# Patient Record
(demographics unavailable — no encounter records)

---

## 2024-10-21 NOTE — HISTORY OF PRESENT ILLNESS
[FreeTextEntry1] : 50 y.o. male with h/o Type 2 DM diagnosed in 2018, HTN, hyperlipidemia and obesity presents for follow up visit.    He was at [facility] and had ablation on 9/6/23. Does feel better now.   Also reports low testosterone level with his PCP.    Had COVID-19 in March 2021 and had pneumonia. Reports dizziness with Jardiance and stopped it.   Currently taking Mounjaro 12.5 mg SQ weekly (tolerating it well) and glimepiride 1 mg daily (was off for 1 month and reports body pains improved). Denies recent hypoglycemia. Did not tolerate Metformin in the past secondary to GI issues.   UTD with ophthalmology (2024) and no retinopathy. Reports dry eyes. Follows with podiatry but not recently. Reports dry skin and cracking of the feet. No proteinuria. No neuropathy.   Not monitoring FS at home. No exercise. Reports constant pain. Reports diffuse body aches but less. Now off gabapentin and taking muscle relaxant and NSAIDs.  Seeing cardiology.   Diet is variable but reports eating less. Reports weight is down.   In regard to vitamin D def, taking vitamin D 50,000 IU weekly.    Also takes vitamin B12 1,000 mcg daily. Also takes vitamin C, magnesium and turmeric.   In regard to hyperlipidemia, stopped Rosuvastatin in the past given body pains and now back on Rosuvastatin 10 mg daily.   Does have sleep apnea and using BIPAP. Does get polyuria and polydipsia. No SOB or CP. Reports depression and anxiety.   In regard to hypogonadism, never had pituitary MRI.

## 2024-10-21 NOTE — REVIEW OF SYSTEMS
[Fatigue] : fatigue [Palpitations] : palpitations [Erectile Dysfunction] : erectile dysfunction [Muscle Weakness] : muscle weakness [Myalgia] : myalgia  [Dry Skin] : dry skin [Pain/Numbness of Digits] : pain/numbness of digits [Depression] : depression [Anxiety] : anxiety [Polydipsia] : polydipsia [Swelling] : swelling [Negative] : Respiratory [Recent Weight Loss (___ Lbs)] : recent weight loss: [unfilled] lbs [Recent Weight Gain (___ Lbs)] : no recent weight gain [Polyuria] : no polyuria [Decreased Libido] : no decreased libido [FreeTextEntry7] : some IBS symptoms

## 2024-10-21 NOTE — PHYSICAL EXAM
[Alert] : alert [No Acute Distress] : no acute distress [Normal Sclera/Conjunctiva] : normal sclera/conjunctiva [EOMI] : extra ocular movement intact [No LAD] : no lymphadenopathy [Thyroid Not Enlarged] : the thyroid was not enlarged [No Thyroid Nodules] : no palpable thyroid nodules [No Respiratory Distress] : no respiratory distress [Clear to Auscultation] : lungs were clear to auscultation bilaterally [Normal S1, S2] : normal S1 and S2 [Regular Rhythm] : with a regular rhythm [Normal Bowel Sounds] : normal bowel sounds [Not Tender] : non-tender [Not Distended] : not distended [Soft] : abdomen soft [Normal Anterior Cervical Nodes] : no anterior cervical lymphadenopathy [Normal Gait] : normal gait [No Rash] : no rash [Right foot was examined, including] : right foot ~C was examined, including visual inspection with sensory and pulse exams [Left foot was examined, including] : left foot ~C was examined, including visual inspection with sensory and pulse exams [Swelling] : swollen [2+] : 2+ in the dorsalis pedis [Diminished Throughout Both Feet] : diminished tactile sensation with monofilament testing throughout both feet [Normal Reflexes] : deep tendon reflexes were 2+ and symmetric [Normal Affect] : the affect was normal [Normal Mood] : the mood was normal [de-identified] : mild edema b/l [FreeTextEntry1] : tinea pedis [FreeTextEntry2] : onychomcyosis [FreeTextEntry5] : tinea pedis [FreeTextEntry6] : onychomycosis

## 2024-10-21 NOTE — ASSESSMENT
[Diabetes Foot Care] : diabetes foot care [Long Term Vascular Complications] : long term vascular complications of diabetes [Carbohydrate Consistent Diet] : carbohydrate consistent diet [Importance of Diet and Exercise] : importance of diet and exercise to improve glycemic control, achieve weight loss and improve cardiovascular health [FreeTextEntry1] : 50 y.o. male with h/o Type 2 DM, HTN, hyperlipidemia, vitamin D def and low testosterone.   1. Type 2 DM- Suboptimal control with Hba1c of 7.5% today which is improved. Encouraged a carbohydrate consistent diet and exercise as tolerated. Encouraged SMBG and forwarding blood glucose log. Discussed risks and benefits of GLP-1 Jose Raul and Mounjaro. Will titrate up Mounjaro to 15 mg SQ weekly. Will continue glimepiride 1 mg daily. Had intolerance to Metformin and SGLT-2 inhibitor. Stable CMP and urine alb/cr ratio in 6/2024.   2. HTN- BP is above goal. Stressed compliance with medication. Will continue Valsartan/HCTZ and metoprolol for now. Follows with cardiology.  3. Hyperlipidemia- Lipids are at goal. Will continue Rosuvastatin 10 mg daily.   4. Vitamin D def- Will continue vitamin D 50,000 Iu weekly.  5. Low testosterone- UTD with testosterone, SHBG, prolactin, LH and FSH. Normal PSA. Will check pituitary MRI. Recommend follow up with urology.    Follow up in 3 to 4 months.  Follow up with cardiology, podiatry and ophthalmology

## 2024-10-21 NOTE — HISTORY OF PRESENT ILLNESS
[FreeTextEntry1] : 50 y.o. male with h/o Type 2 DM diagnosed in 2018, HTN, hyperlipidemia and obesity presents for follow up visit.    He was at [facility] and had ablation on 9/6/23. Does feel better now.   Also reports low testosterone level with his PCP.    Had COVID-19 in March 2021 and had pneumonia. Reports dizziness with Jardiance and stopped it.   Currently taking Mounjaro 12.5 mg SQ weekly (tolerating it well) and glimepiride 1 mg daily (was off for 1 month and reports body pains improved). Denies recent hypoglycemia. Did not tolerate Metformin in the past secondary to GI issues.   UTD with ophthalmology (2024) and no retinopathy. Reports dry eyes. Follows with podiatry but not recently. Reports dry skin and cracking of the feet. No proteinuria. No neuropathy.   Not monitoring FS at home. No exercise. Reports constant pain. Reports diffuse body aches but less. Now off gabapentin and taking muscle relaxant and NSAIDs.  Seeing cardiology.   Diet is variable but reports eating less. Reports weight is down.   In regard to vitamin D def, taking vitamin D 50,000 IU weekly.    Also takes vitamin B12 1,000 mcg daily. Also takes vitamin C, magnesium and turmeric.   In regard to hyperlipidemia, stopped Rosuvastatin in the past given body pains and now back on Rosuvastatin 10 mg daily.   Does have sleep apnea and using BIPAP. Does get polyuria and polydipsia. No SOB or CP. Reports depression and anxiety.   In regard to hypogonadism, never had pituitary MRI.

## 2024-10-21 NOTE — PHYSICAL EXAM
[Alert] : alert [No Acute Distress] : no acute distress [Normal Sclera/Conjunctiva] : normal sclera/conjunctiva [EOMI] : extra ocular movement intact [No LAD] : no lymphadenopathy [Thyroid Not Enlarged] : the thyroid was not enlarged [No Thyroid Nodules] : no palpable thyroid nodules [No Respiratory Distress] : no respiratory distress [Clear to Auscultation] : lungs were clear to auscultation bilaterally [Normal S1, S2] : normal S1 and S2 [Regular Rhythm] : with a regular rhythm [Normal Bowel Sounds] : normal bowel sounds [Not Tender] : non-tender [Not Distended] : not distended [Soft] : abdomen soft [Normal Anterior Cervical Nodes] : no anterior cervical lymphadenopathy [Normal Gait] : normal gait [No Rash] : no rash [Right foot was examined, including] : right foot ~C was examined, including visual inspection with sensory and pulse exams [Left foot was examined, including] : left foot ~C was examined, including visual inspection with sensory and pulse exams [Swelling] : swollen [2+] : 2+ in the dorsalis pedis [Diminished Throughout Both Feet] : diminished tactile sensation with monofilament testing throughout both feet [Normal Reflexes] : deep tendon reflexes were 2+ and symmetric [Normal Affect] : the affect was normal [Normal Mood] : the mood was normal [de-identified] : mild edema b/l [FreeTextEntry1] : tinea pedis [FreeTextEntry2] : onychomcyosis [FreeTextEntry5] : tinea pedis [FreeTextEntry6] : onychomycosis

## 2025-03-12 NOTE — REVIEW OF SYSTEMS
[Fatigue] : fatigue [Recent Weight Loss (___ Lbs)] : recent weight loss: [unfilled] lbs [Palpitations] : palpitations [Erectile Dysfunction] : erectile dysfunction [Muscle Weakness] : muscle weakness [Myalgia] : myalgia  [Dry Skin] : dry skin [Pain/Numbness of Digits] : pain/numbness of digits [Depression] : depression [Anxiety] : anxiety [Polydipsia] : polydipsia [Swelling] : swelling [Negative] : Respiratory [Recent Weight Gain (___ Lbs)] : no recent weight gain [Polyuria] : no polyuria [Decreased Libido] : no decreased libido [FreeTextEntry7] : some IBS symptoms

## 2025-03-12 NOTE — HISTORY OF PRESENT ILLNESS
[FreeTextEntry1] : 50 y.o. male with h/o Type 2 DM diagnosed in 2018, HTN, hyperlipidemia and obesity presents for follow up visit.     He reports palpitations now. Seeing cardiology at the end of the month.   He was at [facility] and had ablation on 9/6/23. Does feel better now.   Also reports low testosterone level with his PCP.    Had COVID-19 in March 2021 and had pneumonia. Reports dizziness with Jardiance and stopped it.   Currently taking Mounjaro 15 mg SQ weekly (tolerating it well) and glimepiride 1 mg daily (was off for 1 month and reports body pains improved). Denies recent hypoglycemia. Did not tolerate Metformin in the past secondary to GI issues.   Past due with ophthalmology (2024) and no retinopathy. Reports dry eyes. Follows with podiatry but not recently. Reports dry skin and cracking of the feet. No proteinuria. No neuropathy.   Not monitoring FS at home. No exercise. Reports constant pain. Reports diffuse body aches but less. Now off gabapentin and taking muscle relaxant and NSAIDs.  Seeing cardiology.   Reports increase in urination.  Diet is variable but reports eating less. Reports weight is down.   In regard to vitamin D def, taking vitamin D 50,000 IU weekly.    Also takes vitamin B12 1,000 mcg daily. Also takes vitamin C, magnesium and turmeric.   In regard to hyperlipidemia, stopped Rosuvastatin in the past given body pains and now back on Rosuvastatin 10 mg daily.   Does have sleep apnea and using BIPAP. Does get polyuria and polydipsia. No SOB or CP. Reports depression and anxiety.   In regard to hypogonadism, never had pituitary MRI.

## 2025-03-12 NOTE — ASSESSMENT
[Diabetes Foot Care] : diabetes foot care [Long Term Vascular Complications] : long term vascular complications of diabetes [Carbohydrate Consistent Diet] : carbohydrate consistent diet [Importance of Diet and Exercise] : importance of diet and exercise to improve glycemic control, achieve weight loss and improve cardiovascular health [FreeTextEntry1] : 50 y.o. male with h/o Type 2 DM, HTN, hyperlipidemia, vitamin D def and low testosterone.   1. Type 2 DM- Good control with Hba1c of 6.3% in 2/2025 which is improved. Encouraged a carbohydrate consistent diet and exercise as tolerated. Encouraged SMBG and forwarding blood glucose log. Discussed risks and benefits of GLP-1 Jose Raul and Mounjaro. Will continue Mounjaro 15 mg SQ weekly. Will continue glimepiride 1 mg daily. Had intolerance to Metformin and SGLT-2 inhibitor. Stable CMP and urine alb/cr ratio in 6/2024.   2. HTN- BP is above goal. Will continue Valsartan/HCTZ and metoprolol for now. Follows with cardiology.  3. Hyperlipidemia- Lipids are at goal. Will continue Rosuvastatin 10 mg daily.   4. Vitamin D def- Will continue vitamin D 50,000 Iu weekly.  5. Low testosterone- UTD with testosterone, SHBG, prolactin, LH and FSH. Normal PSA. Will check pituitary MRI. Recommend follow up with urology.    Follow up in 3 to 4 months.  Follow up with cardiology, podiatry and ophthalmology

## 2025-03-12 NOTE — DATA REVIEWED
[FreeTextEntry1] : Labs 2/10/25 glucose 113  BUN/cr 17/.80 calcium 9.1 TSH 2.10 chol 106 HDL 33 LDL 63 tri 49 H/H 14.5/43.4 Hba1c 6.3%

## 2025-03-12 NOTE — HISTORY OF PRESENT ILLNESS
[FreeTextEntry1] : 50 y.o. male with h/o Type 2 DM diagnosed in 2018, HTN, hyperlipidemia and obesity presents for follow up visit.     He reports palpitations now. Seeing cardiology at the end of the month.   He was at [facility] and had ablation on 9/6/23. Does feel better now.   Also reports low testosterone level with his PCP.    Had COVID-19 in March 2021 and had pneumonia. Reports dizziness with Jardiance and stopped it.   Currently taking Mounjaro 15 mg SQ weekly (tolerating it well) and glimepiride 1 mg daily (was off for 1 month and reports body pains improved). Denies recent hypoglycemia. Did not tolerate Metformin in the past secondary to GI issues.   Past due with ophthalmology (2024) and no retinopathy. Reports dry eyes. Follows with podiatry but not recently. Reports dry skin and cracking of the feet. No proteinuria. No neuropathy.   Not monitoring FS at home. No exercise. Reports constant pain. Reports diffuse body aches but less. Now off gabapentin and taking muscle relaxant and NSAIDs.  Seeing cardiology.   Reports increase in urination.  Diet is variable but reports eating less. Reports weight is down.   In regard to vitamin D def, taking vitamin D 50,000 IU weekly.    Also takes vitamin B12 1,000 mcg daily. Also takes vitamin C, magnesium and turmeric.   In regard to hyperlipidemia, stopped Rosuvastatin in the past given body pains and now back on Rosuvastatin 10 mg daily.   Does have sleep apnea and using BIPAP. Does get polyuria and polydipsia. No SOB or CP. Reports depression and anxiety.   In regard to hypogonadism, never had pituitary MRI.

## 2025-03-12 NOTE — PHYSICAL EXAM
[Alert] : alert [No Acute Distress] : no acute distress [Normal Sclera/Conjunctiva] : normal sclera/conjunctiva [EOMI] : extra ocular movement intact [No LAD] : no lymphadenopathy [Thyroid Not Enlarged] : the thyroid was not enlarged [No Thyroid Nodules] : no palpable thyroid nodules [No Respiratory Distress] : no respiratory distress [Clear to Auscultation] : lungs were clear to auscultation bilaterally [Normal S1, S2] : normal S1 and S2 [Regular Rhythm] : with a regular rhythm [Normal Bowel Sounds] : normal bowel sounds [Not Tender] : non-tender [Not Distended] : not distended [Soft] : abdomen soft [Normal Anterior Cervical Nodes] : no anterior cervical lymphadenopathy [Normal Gait] : normal gait [No Rash] : no rash [Normal Reflexes] : deep tendon reflexes were 2+ and symmetric [Normal Affect] : the affect was normal [Normal Mood] : the mood was normal [de-identified] : mild edema b/l [de-identified] : last foot exam in October 2024

## 2025-07-28 NOTE — REVIEW OF SYSTEMS
[Fatigue] : fatigue [Palpitations] : palpitations [Erectile Dysfunction] : erectile dysfunction [Muscle Weakness] : muscle weakness [Myalgia] : myalgia  [Dry Skin] : dry skin [Pain/Numbness of Digits] : pain/numbness of digits [Depression] : depression [Anxiety] : anxiety [Polydipsia] : polydipsia [Swelling] : swelling [Negative] : Respiratory [Recent Weight Gain (___ Lbs)] : no recent weight gain [Recent Weight Loss (___ Lbs)] : no recent weight loss [Polyuria] : no polyuria [Decreased Libido] : no decreased libido [FreeTextEntry7] : some IBS symptoms

## 2025-07-28 NOTE — ASSESSMENT
[Diabetes Foot Care] : diabetes foot care [Long Term Vascular Complications] : long term vascular complications of diabetes [Carbohydrate Consistent Diet] : carbohydrate consistent diet [Importance of Diet and Exercise] : importance of diet and exercise to improve glycemic control, achieve weight loss and improve cardiovascular health [FreeTextEntry1] : 50 y.o. male with h/o Type 2 DM, HTN, hyperlipidemia, vitamin D def and low testosterone.   1. Type 2 DM- Suboptimal control with Hba1c of 7.6% today. Encouraged a carbohydrate consistent diet and exercise as tolerated. Encouraged SMBG and forwarding blood glucose log. Discussed risks and benefits of GLP-1 Jose Raul and Mounjaro. Will continue Mounjaro 15 mg SQ weekly. Will continue glimepiride 2 mg daily. Had intolerance to Metformin and SGLT-2 inhibitor. Will check CMP and urine alb/cr ratio.   2. HTN- BP is above goal. Will continue Valsartan/HCTZ and metoprolol for now. Follows with cardiology.  3. Hyperlipidemia- Will check lipids. Will continue Rosuvastatin 10 mg daily.   4. Vitamin D def- Will check 25 vitamin D level and will continue vitamin D 50,000 Iu weekly.  5. Low testosterone- UTD with testosterone, SHBG, prolactin, LH and FSH. Normal PSA. Will check pituitary MRI. Recommend follow up with urology.    Follow up in 3 to 4 months.  Follow up with cardiology, urology and podiatry

## 2025-07-28 NOTE — PHYSICAL EXAM
[Alert] : alert [No Acute Distress] : no acute distress [Normal Sclera/Conjunctiva] : normal sclera/conjunctiva [EOMI] : extra ocular movement intact [No LAD] : no lymphadenopathy [Thyroid Not Enlarged] : the thyroid was not enlarged [No Thyroid Nodules] : no palpable thyroid nodules [No Respiratory Distress] : no respiratory distress [Clear to Auscultation] : lungs were clear to auscultation bilaterally [Normal S1, S2] : normal S1 and S2 [Regular Rhythm] : with a regular rhythm [Normal Bowel Sounds] : normal bowel sounds [Not Tender] : non-tender [Not Distended] : not distended [Soft] : abdomen soft [Normal Anterior Cervical Nodes] : no anterior cervical lymphadenopathy [Normal Gait] : normal gait [No Rash] : no rash [Normal Reflexes] : deep tendon reflexes were 2+ and symmetric [Normal Affect] : the affect was normal [Normal Mood] : the mood was normal [de-identified] : mild edema b/l [de-identified] : last foot exam in October 2024 (2) potential problem

## 2025-07-28 NOTE — PHYSICAL EXAM
[Alert] : alert [No Acute Distress] : no acute distress [Normal Sclera/Conjunctiva] : normal sclera/conjunctiva [EOMI] : extra ocular movement intact [No LAD] : no lymphadenopathy [Thyroid Not Enlarged] : the thyroid was not enlarged [No Thyroid Nodules] : no palpable thyroid nodules [No Respiratory Distress] : no respiratory distress [Clear to Auscultation] : lungs were clear to auscultation bilaterally [Normal S1, S2] : normal S1 and S2 [Regular Rhythm] : with a regular rhythm [Normal Bowel Sounds] : normal bowel sounds [Not Tender] : non-tender [Not Distended] : not distended [Soft] : abdomen soft [Normal Anterior Cervical Nodes] : no anterior cervical lymphadenopathy [Normal Gait] : normal gait [No Rash] : no rash [Normal Reflexes] : deep tendon reflexes were 2+ and symmetric [Normal Affect] : the affect was normal [Normal Mood] : the mood was normal [de-identified] : mild edema b/l [de-identified] : last foot exam in October 2024

## 2025-07-28 NOTE — HISTORY OF PRESENT ILLNESS
[FreeTextEntry1] : 50 y.o. male with h/o Type 2 DM diagnosed in 2018, HTN, hyperlipidemia and obesity presents for follow up visit.     He reports palpitations now. Seeing cardiology and needed placement of heart monitor. Needs to follow up with EP specialist.  Needs MRI of hip and x-ray of ankle.   He was at [facility] and had ablation on 9/6/23.   Also reports low testosterone level with his PCP.    Had COVID-19 in March 2021 and had pneumonia. Reports dizziness with Jardiance and stopped it.   Currently taking Mounjaro 15 mg SQ weekly (tolerating it well but has missed for 4 weeks) and taking glimepiride 2 mg daily (was off for 2 weeks because ran out).   Denies recent hypoglycemia.   Did not tolerate Metformin in the past secondary to GI issues.   UTD with ophthalmology (4/2025) and no retinopathy. Reports dry eyes. Follows with podiatry but not recently. Reports dry skin and cracking of the feet. No proteinuria. No neuropathy.   Not monitoring FS at home. No exercise. Reports constant pain. Reports diffuse body aches but less. Now off gabapentin and taking muscle relaxant and NSAIDs.  Seeing cardiology.   Reports increase in urination.  Diet is variable but reports eating less. Reports weight is stable.   In regard to vitamin D def, taking vitamin D 50,000 IU weekly.    Also takes vitamin B12 1,000 mcg daily.  In regard to hyperlipidemia, stopped Rosuvastatin in the past given body pains and now back on Rosuvastatin 10 mg daily and phytosterols.   Does have sleep apnea and using BIPAP. Does get polyuria and polydipsia. No SOB or CP. Reports depression and anxiety.   In regard to hypogonadism, never had pituitary MRI.

## 2025-07-28 NOTE — HISTORY OF PRESENT ILLNESS
[FreeTextEntry1] : 50 y.o. male with h/o Type 2 DM diagnosed in 2018, HTN, hyperlipidemia and obesity presents for follow up visit.     He reports palpitations now. Seeing cardiology and needed placement of heart monitor. Needs to follow up with EP specialist.  Needs MRI of hip and x-ray of ankle.   He was at [facility] and had ablation on 9/6/23.   Also reports low testosterone level with his PCP.    Had COVID-19 in March 2021 and had pneumonia. Reports dizziness with Jardiance and stopped it.   Currently taking Mounjaro 15 mg SQ weekly (tolerating it well but has missed for 4 weeks) and taking glimepiride 2 mg daily (was off for 2 weeks because ran out).   Denies recent hypoglycemia.   Did not tolerate Metformin in the past secondary to GI issues.   UTD with ophthalmology (4/2025) and no retinopathy. Reports dry eyes. Follows with podiatry but not recently. Reports dry skin and cracking of the feet. No proteinuria. No neuropathy.   Not monitoring FS at home. No exercise. Reports constant pain. Reports diffuse body aches but less. Now off gabapentin and taking muscle relaxant and NSAIDs.  Seeing cardiology.   Reports increase in urination.  Diet is variable but reports eating less. Reports weight is stable.   In regard to vitamin D def, taking vitamin D 50,000 IU weekly.    Also takes vitamin B12 1,000 mcg daily.  In regard to hyperlipidemia, stopped Rosuvastatin in the past given body pains and now back on Rosuvastatin 10 mg daily and phytosterols.   Does have sleep apnea and using BIPAP. Does get polyuria and polydipsia. No SOB or CP. Reports depression and anxiety.   In regard to hypogonadism, never had pituitary MRI.